# Patient Record
Sex: MALE | Race: BLACK OR AFRICAN AMERICAN | NOT HISPANIC OR LATINO | Employment: OTHER | URBAN - NONMETROPOLITAN AREA
[De-identification: names, ages, dates, MRNs, and addresses within clinical notes are randomized per-mention and may not be internally consistent; named-entity substitution may affect disease eponyms.]

---

## 2017-01-12 ENCOUNTER — OFFICE VISIT (OUTPATIENT)
Dept: ORTHOPEDIC SURGERY | Facility: CLINIC | Age: 72
End: 2017-01-12

## 2017-01-12 DIAGNOSIS — M16.11 ARTHRITIS OF RIGHT HIP: Primary | ICD-10-CM

## 2017-01-12 DIAGNOSIS — M16.11 PRIMARY OSTEOARTHRITIS OF RIGHT HIP: ICD-10-CM

## 2017-01-12 DIAGNOSIS — Z96.651 STATUS POST TOTAL KNEE REPLACEMENT, RIGHT: ICD-10-CM

## 2017-01-12 PROCEDURE — 99214 OFFICE O/P EST MOD 30 MIN: CPT | Performed by: ORTHOPAEDIC SURGERY

## 2017-01-12 PROCEDURE — 73502 X-RAY EXAM HIP UNI 2-3 VIEWS: CPT | Performed by: ORTHOPAEDIC SURGERY

## 2017-01-12 NOTE — MR AVS SNAPSHOT
Chris Jurgen   2017 9:15 AM   Office Visit    Dept Phone:  [phone]   Encounter #:  [id number]    Provider:  Ray Welch MD   Department:  [facility]                Your Full Care Plan              Your Updated Medication List          This list is accurate as of: 17 10:31 AM.  Always use your most recent med list.                ALPRAZolam 1 MG tablet   Commonly known as:  XANAX       atorvastatin 20 MG tablet   Commonly known as:  LIPITOR       HYDROcodone-acetaminophen 7.5-325 MG per tablet   Commonly known as:  NORCO       LOTREL 10-40 MG per capsule   Generic drug:  amLODIPine-benazepril       metFORMIN 850 MG tablet   Commonly known as:  GLUCOPHAGE       PRECISION XTRA TEST STRIPS test strip   Generic drug:  glucose blood       VIAGRA 100 MG tablet   Generic drug:  sildenafil               We Performed the Following     XR Hip With or Without Pelvis 2 - 3 View Right       You Were Diagnosed With        Codes Comments    Arthritis of right hip    -  Primary ICD-10-CM: M19.90  ICD-9-CM: 716.95       Instructions     None    Patient Instructions History      Upcoming Appointments     Visit Type Date Time Department    FOLLOW UP 2017  9:15 AM [facility]      Wheebox Signup     [facility] Wheebox allows you to send messages to your doctor, view your test results, renew your prescriptions, schedule appointments, and more. To sign up, go to [employer] and click on the Sign Up Now link in the New User? box. Enter your Wheebox Activation Code exactly as it appears below along with the last four digits of your Social Security Number and your Date of Birth () to complete the sign-up process. If you do not sign up before the expiration date, you must request a new code.    Wheebox Activation Code: [email or web address]  Expires: 2017 10:31 AM    If you have questions, you can email  [email or web address] or call [phone] to talk to our [employer] staff. Remember, [employer] is NOT to be used for urgent needs. For medical emergencies, dial 911.               Other Info from Your Visit           Allergies     No Known Allergies      Reason for Visit     Right Hip - Follow-up           Vital Signs     Smoking Status                   Never Smoker           Problems and Diagnoses Noted     Arthritis of right hip    -  Primary      Results     XR Hip With or Without Pelvis 2 - 3 View Right

## 2017-01-12 NOTE — PROGRESS NOTES
Chief Complaint   Patient presents with   • Right Hip - Follow-up           HPI the patient is here today for a follow up of his right hip.  Patient has daily pain and discomfort.  Difficulty with ambulation.  Difficulty in walking on uneven surfaces.  Going up and down the steps bothers him quite significantly.  He has difficulty in turning over in bed at night.  He has a lot of start up pain and groin pain as well.  He is been limping for at least a year and a half and has been trying to use a cane to assist himself in ambulation.  He is an active 71-year-old who is still working 3-4 days a week at a [employer] and he wants to continue that activity.  His hip does slow him down to the point where he is unable to exercise, walk and that leaves him feeling depressed.  He has tried every form of conservative nonoperative care and now wants to proceed with a total hip arthroplasty.         There were no vitals filed for this visit.        Review of Systems   Constitutional: Negative for activity change, appetite change, fatigue, fever and unexpected weight change.   HENT: Negative for congestion, sneezing and voice change.    Eyes: Negative for visual disturbance.   Respiratory: Negative for cough, chest tightness and wheezing.    Cardiovascular: Negative for chest pain, palpitations and leg swelling.   Gastrointestinal: Negative for abdominal distention, constipation, diarrhea and vomiting.   Endocrine: Negative for polydipsia, polyphagia and polyuria.   Genitourinary: Negative for decreased urine volume, difficulty urinating, dysuria, flank pain and frequency.   Musculoskeletal: Positive for gait problem and myalgias. Negative for arthralgias, back pain, joint swelling, neck pain and neck stiffness.   Skin: Negative for color change, pallor and wound.   Allergic/Immunologic: Negative for environmental allergies and food allergies.   Neurological: Positive for weakness and numbness. Negative for  dizziness and headaches.   Hematological: Does not bruise/bleed easily.   Psychiatric/Behavioral: Positive for sleep disturbance. Negative for agitation, confusion and decreased concentration. The patient is not nervous/anxious.            Physical Exam   Constitutional: He is oriented to person, place, and time. He appears well-developed and well-nourished.   HENT:   Head: Normocephalic.   Eyes: Conjunctivae are normal. Pupils are equal, round, and reactive to light.   Neck: Normal range of motion. Neck supple. No JVD present.   Cardiovascular: Normal rate, normal heart sounds and intact distal pulses.    Pulmonary/Chest: Effort normal and breath sounds normal. No respiratory distress.   Abdominal: Soft. Bowel sounds are normal. There is no tenderness. There is no guarding.   Musculoskeletal: He exhibits edema and tenderness.   Lymphadenopathy:     He has no cervical adenopathy.   Neurological: He is alert and oriented to person, place, and time. He has normal reflexes.   Skin: Skin is warm and dry.   Psychiatric: His behavior is normal. Judgment and thought content normal.   Vitals reviewed.          Joint/Body Part Specific Exam:  right hip. Neurovascular status is intact. Patient has a distant limp on the affected side. Internal and external rotations are associated with pain and discomfort. Anterior joint line pain and tenderness is significant. Stinchfield sign is positive. Figure of 4 sign is positive. Patient is unable to perform an active straight leg raise exam. Greater trochanter is tender. Crossover adduction test is positive. Cross body adduction is limited and painful for the patient. Patient has very significant limp and joint line tenderness anteriorly, posteriorly and medially. Dorsalis pedis and posterior tibial artery pulses are palpable. Common peroneal nerve function is well preserved.    right knee.The patient is status post total knee arthroplasty postoperative several month(s). Incision is  clean. Calf is soft and nontender. Homans sign is negative. There is no clicking, popping or catching. Anterior and posterior drawer signs are negative, Appropriate amounts of swelling and bruising are noted. Dorsalis pedis and posterior tibial artery pulses are palpable. Common peroneal nerve function is well preserved. Range of motion is from 0- 115° degrees. Gait is cautious but otherwise fairly normal. There is no evidence of a deep seated joint infection.  X-RAY Report:  Pelvis X-Ray  Indication: Pain with a limp on the right side  AP and Frogleg views  Findings: Advanced degenerative osteoarthritis with complete loss of joint space of the right hip joint with inferior osteophyte formation  no bony lesion  Soft tissues within normal limits  decreased joint spaces  Hardware appropriately positioned not applicable      yes prior studies available for comparison.    X-RAY was ordered and reviewed by Ray Welch MD          Diagnostics:        Chris was seen today for follow-up.    Diagnoses and all orders for this visit:    Arthritis of right hip  -     XR Hip With or Without Pelvis 2 - 3 View Right    Primary osteoarthritis of right hip    Status post total knee replacement, right          Procedures        Plan:  Patient was to go ahead and schedule a right total hip arthroplasty because he is tired of limping and pain and the fact that his hip does not allow him to participate in an active lifestyle as active as he would like to be at age 71.  Risks and benefits discussed with the patient great detail.  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  Tablet ibuprofen 600 mg tab 1 by mouth twice a day when necessary pain.The patient was seen in the office today for preoperative discussion.  The patient has been tried on over-the-counter and prescription NSAID's despite the risks of anti-inflammatory bleeding, peptic  ulcers and erosive gastritis with short term benefit only.  A cane has been prescribed for mechanical support.  Patient has been participating in an exercise program specifically targeting joint pain relief with limited benefit. Intraarticular injections have been used periodically with some but not complete relief of pain.  Ambulation aids have also been utilized.      The details of the surgical procedure were explained including the location of probable incisions and a description of the likely hardware/grafts to be used. The patient understands the likely convalescence after surgery as well as the rehabilitation required.  Also, we have thoroughly discussed with the patient the risks, benefits and alternatives to surgery.  Risks include but are not limited to the risk of infection, joint stiffness, limited range of motion, wound healing problems, scar tissue build up, myocardial infarction, stroke, blood clots (including DVT and/or pulmonary embolus along with the risk of death) neurologic and/or vascular injury, limb length discrepancy, fracture, dislocation, nonunion, malunion, continued pain and need for further surgery including hardware failure requiring revision.     Time spent in the office making a surgical decision and for the surgical discussion the patient 30 minutes

## 2017-02-02 ENCOUNTER — TELEPHONE (OUTPATIENT)
Dept: ORTHOPEDIC SURGERY | Facility: CLINIC | Age: 72
End: 2017-02-02

## 2017-03-27 ENCOUNTER — OUTSIDE FACILITY SERVICE (OUTPATIENT)
Dept: ORTHOPEDIC SURGERY | Facility: CLINIC | Age: 72
End: 2017-03-27

## 2017-03-27 PROCEDURE — 27130 TOTAL HIP ARTHROPLASTY: CPT | Performed by: ORTHOPAEDIC SURGERY

## 2017-03-31 ENCOUNTER — TELEPHONE (OUTPATIENT)
Dept: ORTHOPEDIC SURGERY | Facility: CLINIC | Age: 72
End: 2017-03-31

## 2017-03-31 NOTE — TELEPHONE ENCOUNTER
Fredi with [facility] physical therapy in [facility] called regarding Chris Seaman. Fredi said he showed up at [facility] today wanting to set up therapy but does not have an order. The patient just had surgery on March 27th and has a post-op appt. On April 11th.. I told the therapist that normally we don't get therapy set up until after the patient has been seen post-op but wanted to check with you to make sure I am not missing something. Please advise/rj.

## 2017-04-03 DIAGNOSIS — Z96.641 H/O TOTAL HIP ARTHROPLASTY, RIGHT: Primary | ICD-10-CM

## 2017-04-11 ENCOUNTER — OFFICE VISIT (OUTPATIENT)
Dept: ORTHOPEDIC SURGERY | Facility: CLINIC | Age: 72
End: 2017-04-11

## 2017-04-11 DIAGNOSIS — Z96.641 HISTORY OF TOTAL RIGHT HIP ARTHROPLASTY: Primary | ICD-10-CM

## 2017-04-11 PROCEDURE — 73502 X-RAY EXAM HIP UNI 2-3 VIEWS: CPT | Performed by: ORTHOPAEDIC SURGERY

## 2017-04-11 PROCEDURE — 99024 POSTOP FOLLOW-UP VISIT: CPT | Performed by: ORTHOPAEDIC SURGERY

## 2017-04-11 RX ORDER — DOXYCYCLINE HYCLATE 100 MG/1
100 TABLET, DELAYED RELEASE ORAL 2 TIMES DAILY
Qty: 30 TABLET | Refills: 0 | COMMUNITY

## 2017-04-11 NOTE — PROGRESS NOTES
Chief Complaint   Patient presents with   • Right Hip - Follow-up   • Post-op     RIGHT HIP ARTHROPLASTY   Patient is here for follow up on his right hip arthroplasty. He states that it started to drain Sunday. It is brownish in color. No pain.      HPI patient is doing very well postoperatively.  He does not have a limb length discrepancy.  He is neurovascularly intact.  He is barely using a cane to assist with ambulation.  His pain control is excellent.  He is very pleased with his outcome post hip arthroplasty.  No fevers, rigors or chills.  No evidence of a deep seated joint infection.  He was on xarelto and has developed a superficial hematoma which is draining out close to one of the staples.        There were no vitals filed for this visit.        Joint/Body Part Specific Exam:  left hip. Patient is post op 2 week(s) from total hip arthoplasty, direct anterior. Incision is clean and healing well. Calf is soft and nontender. Homans sign is negative. Skin and soft tissues are appropriate for postoperative status of the patient. Hip flexion is 0-80 degrees, hip abduction is 0-30° degrees. No limb lenth discrepancy. Neurovascular status is intact. Patients gait is significantly improved. The pain relief is extremely dramatic for the patient. Dorsalis pedis and posterior tibial artery pulses palpable. Common peroneal function is well preserved. There is no evidence of cellulitis or erythema or deep seated joint injection.      X-RAY REPORT:  left Hip X-Ray  Indication: Evaluation of implant position after total hip arthroplasty  AP and lateral  Findings: Well placed implants without any subsidence  no bony lesion  Soft tissues within normal limits  within normal limits joint spaces  Hardware appropriately positioned yes      yes prior studies available for comparison.    X-RAY was ordered and reviewed by MD Chris Lala was seen today for post-op and follow-up.    Diagnoses and all orders for this  visit:    History of total right hip arthroplasty  -     XR Hip With or Without Pelvis 2 - 3 View Right            Procedures        Instructions:      Plan: Incision care.    I will discontinue the patient's xarelto because I think he has developed a superficial hematoma which is most likely draining.  There is no indication for surgical intervention at this point.    There is no evidence of a deep seated joint infection and I have discussed that with the patient.    Weightbearing as tolerated.    Follow the direct anterior total hip arthroplasty protocol.    Dislocation precautions.    Follow-up in my office in 2 weeks for reevaluation.    Wet-to-dry dressing changes.    Lois will be left in place at this point and we will remove them in one week.

## 2017-04-25 ENCOUNTER — OFFICE VISIT (OUTPATIENT)
Dept: ORTHOPEDIC SURGERY | Facility: CLINIC | Age: 72
End: 2017-04-25

## 2017-04-25 DIAGNOSIS — Z96.641 STATUS POST TOTAL HIP REPLACEMENT, RIGHT: ICD-10-CM

## 2017-04-25 DIAGNOSIS — Z96.641 HISTORY OF TOTAL RIGHT HIP ARTHROPLASTY: Primary | ICD-10-CM

## 2017-04-25 PROCEDURE — 99024 POSTOP FOLLOW-UP VISIT: CPT | Performed by: ORTHOPAEDIC SURGERY

## 2017-04-25 RX ORDER — RIVAROXABAN 10 MG/1
TABLET, FILM COATED ORAL
Refills: 0 | COMMUNITY
Start: 2017-03-29

## 2017-04-25 RX ORDER — DOXYCYCLINE HYCLATE 100 MG/1
CAPSULE ORAL
Refills: 0 | COMMUNITY
Start: 2017-04-11

## 2017-04-25 RX ORDER — OXYCODONE HYDROCHLORIDE AND ACETAMINOPHEN 5; 325 MG/1; MG/1
TABLET ORAL
Refills: 0 | COMMUNITY
Start: 2017-03-29

## 2017-04-25 NOTE — PROGRESS NOTES
"Chief Complaint   Patient presents with   • Right Hip - Follow-up   • Wound Check         HPI  Patient is here today for a follow up of his right total hip performed on 03/27/17.  He states he is doing \"super good\".  Patient's mobility is improved significantly after total hip arthroplasty.  He does not have any pain that requires the consumption of pain medication.  He does not have a limb length discrepancy.  He is very pleased with his outcome.  The swelling and bruising is settled down nicely.  He did have a slight serous drainage from his incision the last time he was seen because of the affect of the anticoagulants but that has since resolved and his incision line is very clean at this point.        There were no vitals filed for this visit.        Joint/Body Part Specific Exam:  right hip. Patient is post op 4 week(s) from total hip arthoplasty, direct anterior. Incision is clean and healing well. Calf is soft and nontender. Homans sign is negative. Skin and soft tissues are appropriate for postoperative status of the patient. Hip flexion is 0-90 degrees, hip abduction is 0-30 degrees. No limb lenth discrepancy. Neurovascular status is intact. Patients gait is significantly improved. The pain relief is extremely dramatic for the patient. Dorsalis pedis and posterior tibial artery pulses palpable. Common peroneal function is well preserved. There is no evidence of cellulitis or erythema or deep seated joint infection.      X-RAY REPORT:        Chris was seen today for wound check and follow-up.    Diagnoses and all orders for this visit:    History of total right hip arthroplasty    Status post total hip replacement, right            Procedures        Instructions:      Plan:  Incision care.    Falls precaution.    Dislocation precautions.    Continue to follow the total hip arthroplasty, direct anterior protocol.    Calcium and vitamin D for bone health.    He is not asking for any pain medication at this " point.    Follow-up in 3 months.

## 2017-04-30 PROBLEM — Z96.641 STATUS POST TOTAL HIP REPLACEMENT, RIGHT: Status: ACTIVE | Noted: 2017-04-30

## 2017-05-04 ENCOUNTER — OFFICE VISIT (OUTPATIENT)
Dept: ORTHOPEDIC SURGERY | Facility: CLINIC | Age: 72
End: 2017-05-04

## 2017-05-04 DIAGNOSIS — Z96.641 STATUS POST TOTAL HIP REPLACEMENT, RIGHT: Primary | ICD-10-CM

## 2017-05-04 PROCEDURE — 99024 POSTOP FOLLOW-UP VISIT: CPT | Performed by: ORTHOPAEDIC SURGERY

## 2017-08-01 ENCOUNTER — OFFICE VISIT (OUTPATIENT)
Dept: ORTHOPEDIC SURGERY | Facility: CLINIC | Age: 72
End: 2017-08-01

## 2017-08-01 DIAGNOSIS — Z96.641 STATUS POST TOTAL HIP REPLACEMENT, RIGHT: ICD-10-CM

## 2017-08-01 DIAGNOSIS — Z96.641 HISTORY OF RIGHT HIP REPLACEMENT: Primary | ICD-10-CM

## 2017-08-01 PROCEDURE — 73502 X-RAY EXAM HIP UNI 2-3 VIEWS: CPT | Performed by: ORTHOPAEDIC SURGERY

## 2017-08-01 PROCEDURE — 99213 OFFICE O/P EST LOW 20 MIN: CPT | Performed by: ORTHOPAEDIC SURGERY

## 2017-08-01 NOTE — PROGRESS NOTES
Chief Complaint   Patient presents with   • Right Hip - Follow-up   Patient is here today following up on his right hip replacement.      HPI patient had his total hip arthroplasty on 27 March 2017.  This was done with an anterior approach on the right side.  He is doing extremely well at this point.  In fact he states that his hip is doing super good.  He has no issues whatsoever.  There is no limb length discrepancy.  There is no neurovascular compromise.  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  His mobility is improved tremendously and he is very pleased with the postsurgical outcomes.        There were no vitals filed for this visit.        Joint/Body Part Specific Exam:  right hip. Patient is post op 4.5 month(s) from total hip arthoplasty, direct anterior. Incision is clean and healing well. Calf is soft and nontender. Homans sign is negative. Skin and soft tissues are appropriate for postoperative status of the patient. Hip flexion is 0-90° degrees, hip abduction is 0-40° degrees. No limb lenth discrepancy. Neurovascular status is intact. Patients gait is significantly improved. The pain relief is extremely dramatic for the patient. Dorsalis pedis and posterior tibial artery pulses palpable. Common peroneal function is well preserved. There is no evidence of cellulitis or erythema or deep seated joint infection.      X-RAY REPORT:  right Hip X-Ray  Indication: Evaluation of implant position after total hip arthroplasty  AP and lateral  Findings: Excellent position of the components, anatomically placed without any subsidence  no bony lesion  Soft tissues within normal limits  within normal limits joint spaces  Hardware appropriately positioned yes      yes prior studies available for comparison.    X-RAY was ordered and reviewed by MD Chris Lala was seen today for follow-up.    Diagnoses and all orders for this  visit:    History of right hip replacement  -     XR Hip With or Without Pelvis 2 - 3 View Right    Status post total hip replacement, right          Procedures        Instructions:      Plan: Incision care.    Falls precaution.    Dislocation precautions.    Calcium and vitamin D for bone health.    Tablet ibuprofen 600 mg tab 1 by mouth daily at bedtime when necessary pain and discomfort.    , GI and dental procedure prophylaxis with antibiotics to prevent a metastatic infection to the hip arthroplasty implants.    Follow-up in my office in 1 year.

## 2018-08-31 ENCOUNTER — OFFICE VISIT (OUTPATIENT)
Dept: ORTHOPEDIC SURGERY | Facility: CLINIC | Age: 73
End: 2018-08-31

## 2018-08-31 DIAGNOSIS — Z96.641 HISTORY OF TOTAL HIP REPLACEMENT, RIGHT: Primary | ICD-10-CM

## 2018-08-31 DIAGNOSIS — M16.12 PRIMARY OSTEOARTHRITIS OF LEFT HIP: ICD-10-CM

## 2018-08-31 DIAGNOSIS — Z96.641 STATUS POST TOTAL HIP REPLACEMENT, RIGHT: ICD-10-CM

## 2018-08-31 PROCEDURE — 99213 OFFICE O/P EST LOW 20 MIN: CPT | Performed by: ORTHOPAEDIC SURGERY

## 2018-08-31 PROCEDURE — 73502 X-RAY EXAM HIP UNI 2-3 VIEWS: CPT | Performed by: ORTHOPAEDIC SURGERY

## 2018-09-09 PROBLEM — M16.12 PRIMARY OSTEOARTHRITIS OF LEFT HIP: Status: ACTIVE | Noted: 2018-09-09

## 2024-03-22 NOTE — TELEPHONE ENCOUNTER
Good afternoon,    Please create an EPIC MC Referral to support this patients visit to [facility]: 4/4/24     [facility] - NPI [id number]  [address] [place] [address]  CONSULT & TREAT  RICCO DESOUZA- NPI [id number]  DIAGNOSIS CODE - G47.33  CPT CODES 37287, 89930, 86089  6 VISITS     Please inform us when this has been completed.       Thank you.   You can send PT a note for outpatient PT. Tell them to follow a ROSALEE- anterior hip protocol please. Thanks SM